# Patient Record
Sex: FEMALE | Race: BLACK OR AFRICAN AMERICAN | Employment: FULL TIME | URBAN - METROPOLITAN AREA
[De-identification: names, ages, dates, MRNs, and addresses within clinical notes are randomized per-mention and may not be internally consistent; named-entity substitution may affect disease eponyms.]

---

## 2019-05-30 ENCOUNTER — APPOINTMENT (OUTPATIENT)
Dept: PHYSICAL THERAPY | Age: 44
End: 2019-05-30

## 2022-02-16 ENCOUNTER — HOSPITAL ENCOUNTER (OUTPATIENT)
Dept: PHYSICAL THERAPY | Age: 47
End: 2022-02-16
Payer: COMMERCIAL

## 2022-02-22 ENCOUNTER — HOSPITAL ENCOUNTER (OUTPATIENT)
Dept: PHYSICAL THERAPY | Age: 47
Discharge: HOME | End: 2022-02-22
Payer: COMMERCIAL

## 2022-02-22 PROCEDURE — 97110 THERAPEUTIC EXERCISES: CPT

## 2022-02-22 PROCEDURE — 97161 PT EVAL LOW COMPLEX 20 MIN: CPT

## 2022-02-22 NOTE — PROGRESS NOTES
[address] [facility] PHYSICAL THERAPY AT [facility] [address] [facility] - Phone: [phone]  Fax: [phone] / [address] [facility]  Patient Name: Bairon Lockett : 1975   Medical   Diagnosis: Left ankle pain [M25.572] Treatment Diagnosis: Left Insertional Achilles Tendinitis. Onset Date: \"over time\"; this episode 6-8 months ago     Referral Source: Rhea Pitts PA-C Start of Care [facility]): 2022   Prior Hospitalization: See medical history Provider #: [id number]   Prior Level of Function: Previous episode of L heel/Achilles pain 3-4 years ago, mostly resolved with use of boot, occasional pain and limping since. Pt works full time from home as an  (sedentary), active with household chores and driving her teenaged children to/from activities; stairs at home and up/down one at a time; occasionally watches small children; was in a walking program.   Comorbidities: DM; HTN   Medications: Verified on Patient Summary List     The Plan of Care and following information is based on the information from the initial evaluation.   ==================================================================================  Assessment / key information:  Bairon Lockett is a 55 y.o. female with Dx of Left ankle pain [M25.572]. She currently rates her pain as 10/10 at worst, 3/10 at best, primarily located at left Achilles tendon insertion area. She complains of difficulty and increased pain with \"sitting too long\"--standing and walking after longer periods of non weightbearing (sit, lie down). Today in PT, patient reports left Achilles/heel area had \"issues\" 3-4 years ago and she saw a doctor and GABRIELLA COLLINS put into a boot; condition improved, but she has been limping since that episode and occasional pain.   Current episode of same pain began 6-8 months ago without known precipitating event or circumstances. Pt has used her boot occasionally with this episode; last was about 2 weeks ago after a trip to [place] and lots of walking after sitting through her kids' basketball games. Pt reports that her pain is the worst with first getting out of bed in the mornings and beginning to walk. Pt works from home and sits most of the day and her L Achilles/heel hurts a lot with first standing and walking after getting up from her chair. Pt's pain is a little better after she has walked a little bit, but it is increased by longer periods/distances of walking. Pt reports having xrays of her L foot/ankle taken recently and showed \"inflammation\" and a \"spur\" per patient. Pt relates being advised to use boot prn and to use OTC Voltaren gel, which she has been doing for about 1-1.5 weeks; she was also given a heel lift and shown stretching. Pt reports that she also frequently does ankle AROM and she elevates her L LE when possible. Pt often wears tennis shoes or slides, no heels, occasionally low boots; she reports type of foot wear does not affect her symptoms and most footwear does not contact the area of her pain, except the boots and no worse. Pt indicates pain location at left distal Achilles tendon near/at insertion onto calcaneus and reports that it is tender to palpation. Pt denies visible or palpable swelling or deformity in the area of her pain and tenderness. Pt reports R Achilles is OK, but \"acts up\" every now and then; she denies any other LE joint or muscle injuries or problems. Pt reports that she is busy all day with work and then on the go driving her 2 children (ages 12 and 16 yrs old) to their activities. Pt does indoor household chores and her children assist; the kids can do the outdoor chores. Pt has stairs in her home and goes up and down them one at a time; she reports R LE lead for both up and down. Pt occasionally watches her 3and 1year old grand nieces, but her own children help out. Pt reports having insomnia but that her L heel pain does not affect her ability to fall asleep or awaken her during the night, but pain with first standing/walking after getting OOB. Pt reports that driving (automatic transmission) is OK. Pt has access to fitness center/Rec Center nearby. Objective Findings:      Observation:  Moderate bilat foot arches in sitting with feet resting on the floor and low bilaterally in standing; toes rectus, maybe slight hallux valgus right. .  Palpation:  Tender at left distal Achilles tendon and insertion onto calcaneous with mildly increased skin warmth and possible mild swelling or thickening in the area. Functional Mobility:  Sit <==> Stand = WFL with some hand use and OK; Standing = OK; SLS = able to perform on L LE with mildly increased discomfort, OK right; Heel Raises (ankle PF in SLS) = WFL and OK right, unable to perform and painful left; Bend/Squat = patient able to retrieve small item from floor with hand by bending over at hips/trunk with R LE forward/L LE back, able to reach both hands to floor for simulated lift by bending over > squatting (some knee flexion) and R foot somewhat forward/left back; Heel Walking = Not Tested. Gait:  Slowed deb, antalgic left, increased L toe out and decreased L stance time; pt reporting L Achilles pain in L LE weightbearing, especially late stance/push-off. Left Ankle AROM (seated, knee flexed):  DF = -3 to -4 deg L and \"tight\" Achilles, 8 deg R and OK; PF = 48 deg L and 60 deg R, OK bilat; INV and EV = Not Tested, but grossly WFL and OK for both bilaterally. LE PROM and Flexibility:  Hallux DF (supine) = limited bilaterally to about 20-30 degrees, L > R, but not painful; Toes #2-5 DF (as a group) = WFL and OK bilat; Gastroc (supine) = 12 deg bilat, OK right and some distal Achilles/heel discomfort L near/at end range.   LE Manual Muscle Testing (isometric hold in mid range, seated):  Hallux DF = 5/5 and OK bilat; Hallux PF = 5/5 and OK bilat; Toes #2-5 DF (as a group) = WFL and OK bilat; Toes #2-5 PF (as a group) = WFL and OK bilat; Ankle DF = 5/5 and OK bilat; Ankle INV = 5/5 and OK bilat; Ankle EV = 5/5 and OK bilat; Ankle PF = 5/5 and OK right, 4-/5 L and distal Achilles/heell pain. Pt instructed in HEP and will f/u in clinic for PT.  ======================================================= ===================================  Eval Complexity:  History:  MEDIUM  Complexity : 1-2 comorbidities / personal factors will impact the outcome/ POC ;  Examination:  MEDIUM Complexity : 3 Standardized tests and measures addressing body structure, function, activity limitation and / or participation in recreation ; Presentation:  LOW Complexity : Stable, uncomplicated ;  Decision Making:  HIGH Complexity : FOTO score of 1- 25 ; Overall Complexity:  LOW . Problem List:  pain affecting function, decrease ROM, decrease strength, impaired gait/ balance, decrease ADL/ functional abilitiies, decrease activity tolerance and decrease flexibility/ joint mobility. Treatment Plan may include any combination of the following:  Therapeutic exercise, Therapeutic activities, Neuromuscular re-education, Physical agent/modality, Gait/balance training, Manual therapy, Patient education, Self Care training, Functional mobility training, Home safety training and Stair training. Patient / Family readiness to learn indicated by:  asking questions, trying to perform skills and interest.  Persons(s) to be included in education:  Patient (P). Barriers to Learning/Limitations:  None. Measures taken, if barriers to learning: NA   Patient Goal (s): \"min pain, no limping\"     Rehabilitation Potential:  Good.  Short Term Goals: To be accomplished in  3-4 eeks:    1. Independent with HEP for strength, ROM and functional mobility to improve independence with LE ADLs.   2. Decrease max pain to </= 6-7/10 to assist with ADLs, household chores, and community mobility. 3. Increase L ankle DF AROM (seated) by >/= 5-8 degrees. 4. Increase L ankle PF MMT (seated) to >/= 4 to 4+/5 with minimal discomfort. 5. Increase PROM for bilat Hallus DF to >/= 40 degrees.  Long Term Goals: To be accomplished in  6-8 weeks:    1. Decrease max pain to </= 2-3/10 to assist with ADLs, household chores, and community mobility, stairs, and recreation/fitness activities; return to PLOF. 2.  Increase FOTO by >/= 35 points to show functional improvement. 3.  Will rate a +5 on Global Rating of Change and be prepared to DC to [facility] to maximize independence. 4.  Minimal/no tenderness to palpation. 5.  Increase ankle DF AROM (seated) to >/= 10-12 degrees and without pain. 6.  Increase bilat gastroc flexibility to >/= 15-18 degrees and without pain. 7.  Minimal gait deviations. Frequency / Duration:  Patient to be seen  1-2  times per week for 6-8 weeks. Patient / Caregiver education and instruction:  self care and exercises. G-Codes (GP):  NA. Therapist Signature: Santo Gonzalez PT Date: 1/95/9115   Certification Period: NA Time: 3:23 PM   ===========================================================================================  I certify that the above Physical Therapy Services are being furnished while the patient is under my care. I agree with the treatment plan and certify that this therapy is necessary. Physician Signature:        Date:       Time:     Juan Wilson PA-C      Please sign and return to [facility] or you may fax the signed copy to [phone]. Thank you.

## 2022-02-22 NOTE — PROGRESS NOTES
PHYSICAL THERAPY - DAILY TREATMENT NOTE     Patient Name: Cherelle Edwards        Date: 2022  : 1975   YES Patient  Verified  Visit #:      16  Insurance: Payor: Tereso Shaikh / Plan: Yoselin Liang [place] HMO / Product Type: HMO /      In time: 3:20 PM Out time: 4:35 PM   Total Treatment Time: 75 min. Medicare/[employer] Time Tracking (below)   Total Timed Codes (min):  NA 1:1 Treatment Time:  NA     TREATMENT AREA =  Left ankle pain [M25.572]    SUBJECTIVE    Pain Level (on 0 to 10 scale):  7 / 10   Medication Changes/New allergies or changes in medical history, any new surgeries or procedures? NO    If yes, update Summary List   Subjective Functional Status/Changes:  []  No changes reported     See POC         OBJECTIVE  Modalities Rationale:     decrease edema, decrease inflammation and decrease pain to improve patient's ability to stand, walk, community mobility, household chores, stairs, and recreation/fitness activities with no/less pain.          min [] Estim, type/location:                                      []  att     []  unatt     []  w/US     []  w/ice    []  w/heat    min []  Mechanical Traction: type/lbs                   []  pro   []  sup   []  int   []  cont    []  before manual    []  after manual    min []  Ultrasound, settings/location:      min []  Iontophoresis w/ dexamethasone, location:                                               []  take home patch       []  in clinic   10 min [x]  Ice     []  Heat    location/position: Left posterior heel/distal Achilles tendon post-Tx while HEP handouts prepared/seated with foot on floor and ankle in DF.    min []  Vasopneumatic Device, press/temp:     min []  Other:    [x] Skin assessment post-treatment (if applicable):    [x]  intact    []  redness- no adverse reaction     []redness - adverse reaction:      15 min Therapeutic Exercise:  [x]  See flow sheet   Rationale:      increase ROM, increase strength and increase proprioception to improve the patients ability to stand, walk, community mobility, household chores, stairs, and recreation/fitness activities with no/less pain. 3 min Self Care: Discussed use of heat and ice/ice massage; use of boot prn. Rationale:    increase ROM, increase strength and increase proprioception to improve the patients ability to stand, walk, community mobility, household chores, stairs, and recreation/fitness activities with no/less pain. Billed With/As:   [x] TE   [x] TA   [x] Neuro   [x] Self Care Patient Education: [x] Review HEP    [x] Progressed/Changed HEP based on:   [x] positioning   [x] body mechanics   [] transfers   [x] heat/ice application    [x] other:  Patient instructed in, PT demonstrated, and patient briefly performed beginning HEP. Patient given handouts with pictures and written directions for HEP; copies of handouts placed in patient's chart. Other Objective/Functional Measures:    See POC     Post Treatment Pain Level (on 0 to 10) scale:   ? / 10--Not Reassessed. ASSESSMENT    Assessment/Changes in Function:     See POC     []  See Progress Note/Recertification   Patient will continue to benefit from skilled PT services to modify and progress therapeutic interventions, address functional mobility deficits, address ROM deficits, address strength deficits, analyze and address soft tissue restrictions, analyze and cue movement patterns, analyze and modify body mechanics/ergonomics and instruct in home and community integration to attain remaining goals.       Progress toward goals / Updated goals:    See POC       PLAN    [x]  Upgrade activities as tolerated YES Continue plan of care   []  Discharge due to :    []  Other:      Therapist: Camilla Fontana PT    Date: 2/22/2022 Time: 3:23 PM     Future Appointments   Date Time Provider Yennifer Booth   2/22/2022  3:30 PM Shantelle Gibbons PT [facility]

## 2022-03-01 ENCOUNTER — HOSPITAL ENCOUNTER (OUTPATIENT)
Dept: PHYSICAL THERAPY | Age: 47
Discharge: HOME | End: 2022-03-01
Payer: COMMERCIAL

## 2022-03-01 PROCEDURE — 97110 THERAPEUTIC EXERCISES: CPT

## 2022-03-01 PROCEDURE — 97140 MANUAL THERAPY 1/> REGIONS: CPT

## 2022-03-01 PROCEDURE — 97035 APP MDLTY 1+ULTRASOUND EA 15: CPT

## 2022-03-01 NOTE — PROGRESS NOTES
PHYSICAL THERAPY - DAILY TREATMENT NOTE     Patient Name: Cash Keith        Date: 3/1/2022  : 1975   YES Patient  Verified  Visit #:   2   of   16  Insurance: Payor: Evangelina Jiménez / Plan: Lakisha Valdes HMO / Product Type: HMO /      In time: 4:35 PM Out time: 5:29 PM   Total Treatment Time: 54 min. Medicare/[employer] Time Tracking (below)   Total Timed Codes (min):  NA 1:1 Treatment Time:  NA     TREATMENT AREA =  Left ankle pain [M25.572]    SUBJECTIVE    Pain Level (on 0 to 10 scale):  5 / 10   Medication Changes/New allergies or changes in medical history, any new surgeries or procedures? NO    If yes, update Summary List   Subjective Functional Status/Changes:  []  No changes reported       Doing AROM and stretching prior to getting OOB and walking is helpful for decreasing pain. Pt reports no problems with/questions about HEP. OBJECTIVE  Modalities Rationale:     decrease edema, decrease inflammation, decrease pain and increase tissue extensibility to improve patient's ability to stand, walk, community mobility, household chores, stairs, and recreation/fitness activities with no/less pain. min [] Estim, type/location:                                      []  att     []  unatt     []  w/US     []  w/ice    []  w/heat    min []  Mechanical Traction: type/lbs                   []  pro   []  sup   []  int   []  cont    []  before manual    []  after manual   8 min [x]  Ultrasound, settings/location:  1.0 W/cm2 @ 3 MHz 100% x 5.5 min and 1.0 W/cm2 @ 1 MHz x 2.5 min to distal L Achilles insertion in prone with concurrent manual gastroc stretching.    min []  Iontophoresis w/ dexamethasone, location:                                               []  take home patch       []  in clinic   10 min []  Ice     [x]  Heat    location/position: Left posterior leg and heel pre-Tx/supine with knee in extension; last 5 minutes with concurrent self-stretching gastroc using strap at forefoot.     min []  Vasopneumatic Device, press/temp:    If using vaso (only need to measure limb vaso being performed on)      pre-treatment girth :       post-treatment girth :       measured at (landmark location) :      min []  Other:    [x] Skin assessment post-treatment (if applicable):    [x]  intact    []  redness- no adverse reaction                  []redness - adverse reaction:      12 min Therapeutic Exercise:  [x]  See flow sheet   Rationale:      increase ROM and increase strength to improve the patients ability to stand, walk, community mobility, household chores, stairs, and recreation/fitness activities with no/less pain. 24 min Manual Therapy: Technique:      [x] S/DTM [x]IASTM []PROM [x] Passive Stretching   []manual TPR    []Jt manipulation:Gr I [] II []  III [] IV[] V[]  Treatment Area: In prone, bouts of manual stretching to left gastroc; continuous light stretch to same x 5 min during US. Cross friction massage to distal L Achilles in prone--about 1-2 minutes to total of about 5 different area of posterior and medial tendon (~7 min total). IASTM with RockBlade to L Achilles in prone--bouts of feathering, deep stroking along tendon distal ==> prox and prox ==> distal, and focused deep friction to areas of distal Achilles. Rationale:      decrease pain, increase ROM, increase tissue extensibility and decrease edema  to improve patient's ability to stand, walk, community mobility, household chores, stairs, and recreation/fitness activities with no/less pain. The manual therapy interventions were performed at a separate and distinct time from the therapeutic activities interventions.     Billed With/As:   [] TE   [] TA   [] Neuro   [] Self Care Patient Education: [x] Review HEP    [] Progressed/Changed HEP based on:   [] positioning   [] body mechanics   [] transfers   [] heat/ice application    [] other:        Other Objective/Functional Measures:    Tender left distal posterior Achilles with small area of focal thickening; also tender medial aspect of left distal Achilles. Post Treatment Pain Level (on 0 to 10) scale:   3 / 10     ASSESSMENT    Assessment/Changes in Function:     Mild discomfort with manual stretching; able to tolerate DTM and IASTM. []  See Progress Note/Recertification   Patient will continue to benefit from skilled PT services to modify and progress therapeutic interventions, address functional mobility deficits, address ROM deficits, address strength deficits, analyze and address soft tissue restrictions, analyze and cue movement patterns, analyze and modify body mechanics/ergonomics and instruct in home and community integration to attain remaining goals. Progress toward goals / Updated goals:    Good Progress to    [x] STG    [] LTG  1 - 3 as shown by patient reporting and observed motion during PT session. PLAN    [x]  Upgrade activities as tolerated YES Continue plan of care   []  Discharge due to :    [x]  Other: Work on PROM for first toe DF and ankle T-band exercises.      Therapist: Amber Plascencia PT    Date: 3/1/2022 Time: 12:34 PM     Future Appointments   Date Time Provider Yennifer Booth   3/1/2022  4:30 PM Leigh Cali, PT [facility]   3/3/2022  4:30 PM Leigh Cali, PT [facility]   3/8/2022  4:30 PM Leigh Cali, PT [facility]   3/10/2022  5:15 PM Leigh Cali PT [facility]   3/15/2022  4:30 PM Leigh Cali, PT [facility]   3/17/2022  4:30 PM Leigh Cali, PT [facility]   3/22/2022  4:30 PM Leigh Cali, PT [facility]   3/24/2022  4:30 PM Leigh Cali, PT [facility]   3/29/2022  4:30 PM Leigh Cali, PT [facility]   3/31/2022  4:30 PM Leigh Cali, PT [facility]   4/5/2022  4:30 PM Leigh Cali, PT [facility]   4/7/2022  4:30 PM Leigh Cali, PT [facility]   4/12/2022  4:30 PM Leigh Cali, PT [facility]   4/14/2022  4:30 PM Leigh Cali, PT [facility]

## 2022-03-03 ENCOUNTER — HOSPITAL ENCOUNTER (OUTPATIENT)
Dept: PHYSICAL THERAPY | Age: 47
End: 2022-03-03
Payer: COMMERCIAL

## 2022-03-08 ENCOUNTER — HOSPITAL ENCOUNTER (OUTPATIENT)
Dept: PHYSICAL THERAPY | Age: 47
Discharge: HOME | End: 2022-03-08
Payer: COMMERCIAL

## 2022-03-08 PROCEDURE — 97140 MANUAL THERAPY 1/> REGIONS: CPT

## 2022-03-08 PROCEDURE — 97110 THERAPEUTIC EXERCISES: CPT

## 2022-03-08 PROCEDURE — 97035 APP MDLTY 1+ULTRASOUND EA 15: CPT

## 2022-03-08 NOTE — PROGRESS NOTES
PHYSICAL THERAPY - DAILY TREATMENT NOTE      Patient Name: Sang Speaker                                   Date: 2022  : 1975                                    YES Patient  Verified  Visit #:   3      16                  Insurance: Payor: Jean Weaver / Plan: [address] [place] [address] [place] [employer] / Product Type: HMO /       In time: 4:37 PM Out time: 5:30 PM   Total Treatment Time: 53 min.          Medicare/[employer] Time Tracking (below)   Total Timed Codes (min):  NA 1:1 Treatment Time:  NA      TREATMENT AREA =  Left ankle pain [M25.572]     SUBJECTIVE     Pain Level (on 0 to 10 scale):  5-6  10   Medication Changes/New allergies or changes in medical history, any new surgeries or procedures? NO    If yes, update Summary List   Subjective Functional Status/Changes:  []? No changes reported         Doing AROM and stretching prior to getting OOB and walking is helpful for decreasing pain. Pt reports no problems with/questions about HEP.        OBJECTIVE  Modalities Rationale:     decrease edema, decrease inflammation, decrease pain and increase tissue extensibility to improve patient's ability to stand, walk, community mobility, household chores, stairs, and recreation/fitness activities with no/less pain.                 min []? Estim, type/location:                                                            []?  att     []?  unatt     []?  w/US     []?  w/ice    []?  w/heat     min []? Mechanical Traction: type/lbs                    []?  pro   []?  sup   []? int   []?  cont    []? before manual    []? after manual   8 min [x]? Ultrasound, settings/location:  1.0 W/cm2 @ 3 MHz 100% x 4 min and 1.0 W/cm2 @ 1.5 MHz x 4 min to distal L Achilles insertion in prone with concurrent manual gastroc stretching.     min []? Iontophoresis w/ dexamethasone, location:                                                []?  take home patch       []? in clinic   10 min []? Ice     [x]?   Heat    location/position: Left posterior leg and heel pre-Tx/supine with knee in extension; last 5 minutes with concurrent self-stretching gastroc using strap at forefoot.     min []? Vasopneumatic Device, press/temp:     If using vaso (only need to measure limb vaso being performed on)      pre-treatment girth :       post-treatment girth :       measured at (landmark location) :       min []? Other:     [x]? Skin assessment post-treatment (if applicable):    [x]? intact    []? redness- no adverse reaction                  []?redness - adverse reaction:        12 min Therapeutic Exercise:  [x]? See flow sheet   Rationale:      increase ROM and increase strength to improve the patients ability to stand, walk, community mobility, household chores, stairs, and recreation/fitness activities with no/less pain.       23 min Manual Therapy: Technique:      [x]? S/DTM [x]? IASTM []? PROM [x]? Passive Stretching   []?manual TPR    []? Jt manipulation:Gr I []? II []? III []? IV[]? V[]? Treatment Area: In prone, bouts of manual stretching to left gastroc; continuous light stretch to same x 5 min during US. Cross friction massage to distal L Achilles in prone--about 1-2 minutes to total of about 5 different area of posterior and medial tendon (~7 min total). IASTM with RockBlade to L Achilles in prone--bouts of feathering, deep stroking along tendon distal ==> prox and prox ==> distal, and focused deep friction to areas of distal Achilles. Rationale:      decrease pain, increase ROM, increase tissue extensibility and decrease edema  to improve patient's ability to stand, walk, community mobility, household chores, stairs, and recreation/fitness activities with no/less pain.   The manual therapy interventions were performed at a separate and distinct time from the therapeutic activities interventions.     Billed With/As:   []? TE   []? TA   []? Neuro   []? Self Care Patient Education: [x]? Review HEP    []?  Progressed/Changed HEP based on:   []? positioning []? body mechanics   []? transfers   []? heat/ice application    []? other:              Other Objective/Functional Measures:     Tender left distal posterior Achilles with small area of focal thickening; also tender medial aspect of left distal Achilles. Pt reports being a little sore post-Tx and mild limping with leaving PT.      Post Treatment Pain Level (on 0 to 10) scale:   ? / 10--Not Reassessed.      ASSESSMENT     Assessment/Changes in Function:     Okay with manual stretching; some discomfort with DTM and IASTM.           []? See Progress Note/Recertification   Patient will continue to benefit from skilled PT services to modify and progress therapeutic interventions, address functional mobility deficits, address ROM deficits, address strength deficits, analyze and address soft tissue restrictions, analyze and cue movement patterns, analyze and modify body mechanics/ergonomics and instruct in home and community integration to attain remaining goals. Progress toward goals / Updated goals:     Good Progress to    [x]? STG    []? LTG  1 - 3 as shown by patient reporting and observed motion during PT session.         PLAN           [x]? Upgrade activities as tolerated YES Continue plan of care   []? Discharge due to :     [x]?   Other: Work on PROM for first toe DF and ankle T-band exercises.      Therapist: JORGE Ramirez     Date: 03/08/2022 Time: 11:45 AM

## 2022-03-10 ENCOUNTER — APPOINTMENT (OUTPATIENT)
Dept: PHYSICAL THERAPY | Age: 47
End: 2022-03-10
Payer: COMMERCIAL

## 2022-03-17 ENCOUNTER — HOSPITAL ENCOUNTER (OUTPATIENT)
Dept: PHYSICAL THERAPY | Age: 47
Discharge: HOME | End: 2022-03-17
Payer: COMMERCIAL

## 2022-03-17 PROCEDURE — 97110 THERAPEUTIC EXERCISES: CPT

## 2022-03-17 PROCEDURE — 97140 MANUAL THERAPY 1/> REGIONS: CPT

## 2022-03-17 PROCEDURE — 97035 APP MDLTY 1+ULTRASOUND EA 15: CPT

## 2022-03-17 NOTE — PROGRESS NOTES
PHYSICAL THERAPY - DAILY TREATMENT NOTE      Patient Name: Virginia Fitzgerald                                   Date: 2022  : 1975                                    YES Patient  Verified  Visit #:                     Insurance: Payor: Makenna Hill / Plan: [address] [place] [address] [place] [employer] / Product Type: HMO /       In time: 4:30 PM Out time: 5:30 PM   Total Treatment Time: 60 min.            Medicare/[employer] Time Tracking (below)   Total Timed Codes (min):  NA 1:1 Treatment Time:  NA      TREATMENT AREA =  Left ankle pain [M25.572]     SUBJECTIVE     Pain Level (on 0 to 10 scale):  5 / 10   Medication Changes/New allergies or changes in medical history, any new surgeries or procedures?    NO    If yes, update Summary List   Subjective Functional Status/Changes:  []??  No changes reported         Pt reports that she is trying to be very consistent with her HEP--no questions or problems. OK later after last PT session.         OBJECTIVE  Modalities Rationale:     decrease edema, decrease inflammation, decrease pain and increase tissue extensibility to improve patient's ability to stand, walk, community mobility, household chores, stairs, and recreation/fitness activities with no/less pain.                           min []? ? Estim, type/location:                                                            []? ?  att     []? ?  unatt     []? ?  w/US     []? ?  w/ice    []? ?  w/heat     min []? ?  Mechanical Traction: type/lbs                    []? ?  pro   []? ?  sup   []? ?  int   []? ?  cont    []? ?  before manual    []? ?  after manual   8 min [x]? ?  Ultrasound, settings/location:  1.2 W/cm2 @ 3 MHz 100% x 4 min and 1.0 W/cm2 @ 1.2 MHz x 4 min to distal L Achilles insertion in prone with concurrent manual gastroc stretching.     min []? ?  Iontophoresis w/ dexamethasone, location:                                                []? ?  take home patch       []? ?[address]  [facility]   10 min []? ?  Ice     [x]? ?  Heat    location/position: Left posterior leg and heel pre-Tx/supine with knee in extension; last 5 minutes with concurrent self-stretching gastroc using strap at forefoot.     min []? ?  Vasopneumatic Device, press/temp:     If using vaso (only need to measure limb vaso being performed on)      pre-treatment girth :       post-treatment girth :       measured at (landmark location) :       min []? ?  Other:     [x]? ? Skin assessment post-treatment (if applicable):    [x]? ?  intact    []? ?  redness- no adverse reaction                  []? ?redness - adverse reaction:         15 min Therapeutic Exercise:  [x]? ?  See flow sheet   Rationale:      increase ROM and increase strength to improve the patients ability to stand, walk, community mobility, household chores, stairs, and recreation/fitness activities with no/less pain.       25 min Manual Therapy: Technique:      [x]? ? S/DTM [x]? ?IASTM []? ?PROM [x]? ? Passive Stretching   []? ?manual TPR    []? ?Jt manipulation:Gr I []? ? II []??  III []?? IV[]? ? V[]?? Treatment Area:  In prone, bouts of manual stretching to left gastroc; continuous light stretch to same x 5 min during US.  Cross friction massage to distal L Achilles in prone--about 1-2 minutes to total of about 5 different area of posterior and medial tendon (~7 min total).  IASTM with RockBlade to L Achilles in prone--bouts of feathering, deep stroking along tendon distal ==> prox and prox ==> distal, and focused deep friction to areas of distal Achilles. Rationale:      decrease pain, increase ROM, increase tissue extensibility and decrease edema  to improve patient's ability to stand, walk, community mobility, household chores, stairs, and recreation/fitness activities with no/less pain.   The manual therapy interventions were performed at a separate and distinct time from the therapeutic activities interventions.     Billed With/As:   []?? TE   []?? TA   []? ? Neuro   []? ? Self Care Patient Education: [x]? ? Review HEP    []?? Progressed/Changed HEP based on:   []?? positioning   []? ? body mechanics   []? ? transfers   []? ? heat/ice application    []? ? other:                Other Objective/Functional Measures:     Tender left distal posteromedial Achilles ; small area of focal thickening left distal Achilles posteriorly/midline.      Post Treatment Pain Level (on 0 to 10) scale:   5 / 10      ASSESSMENT     Assessment/Changes in Function:        Okay with manual stretching; some discomfort with DTM and IASTM. []??  See Progress Note/Recertification   Patient will continue to benefit from skilled PT services to modify and progress therapeutic interventions, address functional mobility deficits, address ROM deficits, address strength deficits, analyze and address soft tissue restrictions, analyze and cue movement patterns, analyze and modify body mechanics/ergonomics and instruct in home and community integration to attain remaining goals.      Progress toward goals / Updated goals:     Good Progress to    [x]? ? STG    []? ? LTG  1 - 3 as shown by patient reporting and observed motion during PT session.         PLAN               [x]? ?  Upgrade activities as tolerated YES Continue plan of care   []? ?  Discharge due to :     [x]? ?  Other: Work on PROM for first toe DF and ankle T-band exercises.            Therapist: JORGE Eaton     Date: 03/17/2022 Time: 1:07 PM

## 2022-03-22 ENCOUNTER — APPOINTMENT (OUTPATIENT)
Dept: PHYSICAL THERAPY | Age: 47
End: 2022-03-22
Payer: COMMERCIAL

## 2022-03-23 ENCOUNTER — HOSPITAL ENCOUNTER (OUTPATIENT)
Dept: PHYSICAL THERAPY | Age: 47
Discharge: HOME | End: 2022-03-23
Payer: COMMERCIAL

## 2022-03-23 PROCEDURE — 97110 THERAPEUTIC EXERCISES: CPT

## 2022-03-23 PROCEDURE — 97035 APP MDLTY 1+ULTRASOUND EA 15: CPT

## 2022-03-23 PROCEDURE — 97140 MANUAL THERAPY 1/> REGIONS: CPT

## 2022-03-23 PROCEDURE — 97530 THERAPEUTIC ACTIVITIES: CPT

## 2022-03-23 NOTE — PROGRESS NOTES
PHYSICAL THERAPY - DAILY TREATMENT NOTE     Patient Name: Daily Cannon        Date: 3/23/2022  : 1975   YES Patient  Verified  Visit #:  of   16  Insurance: Payor: Nitin Romero / Plan: [employer] / Product Type: HMO /      In time: 4:17 pm Out time: 5:25    Total Treatment Time: 68     Medicare/[employer] Time Tracking (below)   Total Timed Codes (min):  - 1:1 Treatment Time:  -     TREATMENT AREA =  Left ankle pain [M25.572]    SUBJECTIVE    Pain Level (on 0 to 10 scale):  5 / 10   Medication Changes/New allergies or changes in medical history, any new surgeries or procedures?     NO    If yes, update Summary List   Subjective Functional Status/Changes:  []  No changes reported       Functional improvements: pt reports she feels better when she leaves therapy  Functional impairments: worse in am still getting up to 10/10  and after sitting during the day work          OBJECTIVE  Modalities Rationale:     decrease edema, decrease inflammation, decrease pain and increase tissue extensibility to improve patient's ability to decrease edema, decrease inflammation, decrease pain and increase tissue extensibility to improve patient's ability to stand, walk, community mobility, household chores, stairs, and recreation/fitness activities with no/less pain.    min [] Estim, type/location:                                      []  att     []  unatt     []  w/US     []  w/ice    []  w/heat    min []  Mechanical Traction: type/lbs                   []  pro   []  sup   []  int   []  cont    []  before manual    []  after manual   8 min []  Ultrasound, settings/location:  1.2 W/cm2 @ 3 MHz 100% x 4 min and 1.0 W/cm2 @ 1.2 MHz x 4 min to distal L Achilles insertion in prone with concurrent manual gastroc stretching.    min []  Iontophoresis w/ dexamethasone, location:                                               []  take home patch       []  in clinic   20 min []  Ice     []  Heat    location/position: Left posterior leg and heel pre-Tx/supine with knee elevated ; last 5 minutes with concurrent self-stretching gastroc using strap at forefoot. min []  Vasopneumatic Device, press/temp:    If using vaso (only need to measure limb vaso being performed on)      pre-treatment girth :       post-treatment girth :       measured at (landmark location) :      min []  Other:    [x] Skin assessment post-treatment (if applicable):    [x]  intact    []  redness- no adverse reaction                  []redness - adverse reaction:      16 min Therapeutic Exercise:  [x]  See flow sheet   Rationale:      increase ROM and increase strength to improve the patients ability to decrease edema, decrease inflammation, decrease pain and increase tissue extensibility to improve patient's ability to stand, walk, community mobility, household chores, stairs, and recreation/fitness activities with no/less pain.     10 min Therapeutic Activity: [x]  Pt education in proper supportive footwear, activity modification, use of HEP , ice , slow progression of home walking program ~ 5 min as tolerated   Rationale:      increase ROM and increase strength to improve the patients ability to to improve the patients ability to decrease edema, decrease inflammation, decrease pain and increase tissue extensibility to improve patient's ability to stand, walk, community mobility, household chores, stairs, and recreation/fitness activities with no/less pain    15 min Manual Therapy: Technique:        In prone DTM/STM to left gastroc, PF, Achilles lifting, gentle PROM into DF; review self ms techniques   Rationale:      decrease pain, increase ROM, increase tissue extensibility and decrease trigger points to improve patient's ability to to improve the patients ability to to improve the patients ability to decrease edema, decrease inflammation, decrease pain and increase tissue extensibility to improve patient's ability to stand, walk, community mobility, household chores, stairs, and recreation/fitness activities with no/less juan  The manual therapy interventions were performed at a separate and distinct time from the therapeutic activities interventions. Billed With/As:   [] TE   [] TA   [] Neuro   [] Self Care Patient Education: [x] Review HEP    [] Progressed/Changed HEP based on:   [] positioning   [] body mechanics   [] transfers   [] heat/ice application    [] other:        Other Objective/Functional Measures:    Pt education in supportive footwear, HEP, activity modification for pain management     Post Treatment Pain Level (on 0 to 10) scale:   4 / 10     ASSESSMENT    Assessment/Changes in Function:     TrPt tenderness persists distal Achilles; pt education in self ms techniques     []  See Progress Note/Recertification   Patient will continue to benefit from skilled PT services to modify and progress therapeutic interventions, address functional mobility deficits, address ROM deficits, address strength deficits, analyze and address soft tissue restrictions, analyze and cue movement patterns, analyze and modify body mechanics/ergonomics and instruct in home and community integration to attain remaining goals. Progress toward goals / Updated goals:  1. Independent with HEP for strength, ROM and functional mobility to improve independence with LE ADLs. met pt performing HEP daily  2. Decrease max pain to </= 6-7/10 to assist with ADLs, household chores, and community mobility. Partially met- max pain this week 8/10   3. Increase L ankle DF AROM (seated) by >/= 5-8 degrees. 4. Increase L ankle PF MMT (seated) to >/= 4 to 4+/5 with minimal discomfort. 5. Increase PROM for bilat Hallus DF to >/= 40 degrees.      PLAN    [x]  Upgrade activities as tolerated YES Continue plan of care   []  Discharge due to :    []  Other:      Therapist: Barbara Prieto PTA    Date: 3/23/2022 Time: 5:25  PM     Future Appointments   Date Time Provider Yennifer Booth   3/23/2022  4:15 PM Kimberly Harper, PTA [facility]   3/24/2022  4:30 PM Brayden Furry, PT [facility]   3/29/2022  4:30 PM Brayden Furry, PT [facility]   3/31/2022  4:30 PM Brayden Furry, PT [facility]   4/5/2022  4:30 PM Brayden Furry, PT [facility]   4/7/2022  4:30 PM Brayden Furry, PT [facility]   4/12/2022  4:30 PM Brayden Furry, PT [facility]   4/14/2022  4:30 PM [facility]

## 2022-03-29 ENCOUNTER — APPOINTMENT (OUTPATIENT)
Dept: PHYSICAL THERAPY | Age: 47
End: 2022-03-29
Payer: COMMERCIAL

## 2022-03-31 ENCOUNTER — HOSPITAL ENCOUNTER (OUTPATIENT)
Dept: PHYSICAL THERAPY | Age: 47
Discharge: HOME | End: 2022-03-31
Payer: COMMERCIAL

## 2022-03-31 PROCEDURE — 97140 MANUAL THERAPY 1/> REGIONS: CPT

## 2022-03-31 PROCEDURE — 97035 APP MDLTY 1+ULTRASOUND EA 15: CPT

## 2022-03-31 PROCEDURE — 97110 THERAPEUTIC EXERCISES: CPT

## 2022-03-31 NOTE — PROGRESS NOTES
[address] [place] [address] PHYSICAL THERAPY AT [facility] [place], [address]  Phone: [phone]  Fax: [phone]  PROGRESS NOTE  Patient Name: Kayden Cabrera : 1975   Treatment/Medical Diagnosis: Left ankle pain [M25.572]   Referral Source: Ami Ventura PA-C     Date of Initial Visit: 2022 Attended Visits: 6 Missed Visits: 4     SUMMARY OF TREATMENT:    Patient has been seen for a total of 6 visits in PT, including Initial Eval with instruction in beginning HEP/self-care routine and 5 additional treatment session. PT treatment sessions have included MHP, US, Manual Therapy (stretching, DTM, and IASTM), Therapeutic Exercise performance (stretching and strengthening), and HEP progression/patient education. CURRENT STATUS:    Patient has progressed very well in PT, despite inconsistent attendance. Today in PT, patient related that she has been doing lots of stretching and that she can feel a difference in the mornings; when she gets up out of bed, her left Achilles does not hurt as much. Left Ankle DF AROM (knee flexed) and Gastroc flexibility (DF PROM with knee in ext) have both increased a great deal.  Left hallux MTP joint DF PROM has increased to 40 degrees. Tenderness to palpation is still present, but decreased, as well as diminished thickening in the tendon at site of pain/tenderness. Reported pain levels are somewhat decreased, but mild gait deviations persist.  Today's FOTO score was 39/100; her score at intake was 15/100, with a predicted discharge value of 35/100. Goal/Measure of Progress:   Goal Met?:     1.  1. Independent with HEP for strength, ROM and functional mobility to improve independence with LE ADLs. Status at last Eval: No HEP. Current Status: Pt very compliant with home stretching program. Yes/Progressing   2.  2. Decrease max pain to </= 6-7/10 to assist with ADLs, household chores, and community mobility. Status at last Eval: Was 3-10/10. Current Status: Patient has typically been reporting pain levels of ~5/10 when arriving at PT. Yes/Progressing   3.  3. Increase L ankle DF AROM (seated) by >/= 5-8 degrees. Status at last Eval: Was -3 to -4 deg. Current Status: +20 deg. Yes   4.  4. Increase L ankle PF MMT (seated) to >/= 4 to 4+/5 with minimal discomfort. 5. Increase PROM for bilat Hallus DF to >/= 40 degrees. Status at last Eval: 4. Was 4 -/5 with discomfort. 5. Was 20-30 deg. Current Status: 4. WFL with mild discomfort. 5. Now L = 40 deg. 4. Yes/Progressing  5. Yes/Progressing     New Goals to be achieved in __4__  weeks:    1.   1.  Decrease max pain to </= 2-3/10 to assist with ADLs, household chores, and community mobility, stairs, and recreation/fitness activities; return to PLOF--Not Met/Progressing. 2.  2.  Increase FOTO by >/= 35 points to show functional improvement--Progressing. 3.  3.  Will rate a +5 on Global Rating of Change and be prepared to DC to HEP to maximize independence--Not Tested/To Be Assessed. 4.  4.  Minimal/no tenderness to palpation--Progressing. 5.  Increase ankle DF AROM (seated) to >/= 10-12 degrees and without pain--MET. 6.  Increase bilat gastroc flexibility to >/= 15-18 degrees and without pain--MET. 7.  Minimal gait deviations--Progressing. RECOMMENDATIONS:    Continue with PT POC and progress TE/TA and HEP as tolerated to reach all goals, then DC to HEP/Self-Care Routine. If you have any questions/comments please contact us directly at [phone]. Thank you for allowing us to assist in the care of your patient. Therapist Signature: Ganga Morris, PT Date: 3/31/2022     Time: 10:32 AM   NOTE TO PHYSICIAN:  PLEASE COMPLETE THE ORDERS BELOW AND FAX TO   [facility] at [address]: [phone].   If you are unable to process this request in 24 hours please contact our office: [phone].    ___ I have read the above report and request that my patient continue as recommended.   ___ I have read the above report and request that my patient continue therapy with the following changes/special instructions:_________________________________________________   ___ I have read the above report and request that my patient be discharged from therapy.      Physician Signature:                   Date:       Time:

## 2022-03-31 NOTE — PROGRESS NOTES
PHYSICAL THERAPY - DAILY TREATMENT NOTE      Patient Name: Virginia Fitzgerald                                   Date: 2022  : 1975                                    YES Patient  Verified  Visit #:      [id number]                  Insurance: Payor: Madhu Godwin / Plan: [place] [employer] / Product Type: HMO /       In time: 4:30 PM Out time: 5:30 PM   Total Treatment Time: 60 min.            Medicare/[employer] Time Tracking (below)   Total Timed Codes (min):  NA 1:1 Treatment Time:  NA      TREATMENT AREA =  Left ankle pain [M25.572]     SUBJECTIVE     Pain Level (on 0 to 10 scale):  5 / 10   Medication Changes/New allergies or changes in medical history, any new surgeries or procedures?    NO    If yes, update Summary List   Subjective Functional Status/Changes:  []???  No changes reported         Pt reports that she is currently feeling OK and has been doing lots of stretching. Pt relates that she can see and feel a difference in the mornings when she gets up and it does not suzette as much.         OBJECTIVE  Modalities Rationale:     decrease edema, decrease inflammation, decrease pain and increase tissue extensibility to improve patient's ability to stand, walk, community mobility, household chores, stairs, and recreation/fitness activities with no/less pain.                           min []? ?? Estim, type/location:                                                            []? ??  att     []? ??  unatt     []? ??  w/US     []? ??  w/ice    []? ??  w/heat     min []? ??  Mechanical Traction: type/lbs                    []? ??  pro   []? ??  sup   []? ??  int   []? ??  cont    []? ??  before manual    []? ??  after manual   8 min [x]? ??  Ultrasound, settings/location:  0.6 - 0.8 W/cm2 @ 3 MHz 100% x 4 min and 1.0 W/cm2 @ 0.8 - 1.0 MHz x 4 min to distal L Achilles insertion in prone with concurrent manual gastroc stretching.     min []? ??  Iontophoresis w/ dexamethasone, location:                                              []? ??  take home patch       []? ??  in clinic   10 min []? ??  Ice     [x]? ??  Heat    location/position: Left posterior leg and heel pre-Tx/supine with knee in extension; last 5 minutes with concurrent self-stretching gastroc using strap at forefoot.     min []? ??  Vasopneumatic Device, press/temp:     If using vaso (only need to measure limb vaso being performed on)      pre-treatment girth :       post-treatment girth :       measured at (landmark location) :       min []? ??  Other:     [x]? ?? Skin assessment post-treatment (if applicable):    [x]? ??  intact    []? ??  redness- no adverse reaction                  []? ??redness - adverse reaction:         27 min Therapeutic Exercise:  [x]? ??  See flow sheet--Included FOTO and Reassessment. Rationale:      increase ROM and increase strength to improve the patients ability to stand, walk, community mobility, household chores, stairs, and recreation/fitness activities with no/less pain.       15 min Manual Therapy: Technique:      [x]? ?? S/DTM [x]? ??IASTM []???PROM [x]? ?? Passive Stretching   []? ??manual TPR    []? ??Jt manipulation:Gr I []? ?? II []???  III []??? IV[]? ?? V[]???  Treatment Area:  In prone, bouts of manual stretching to left gastroc; continuous light stretch to same x 8 min during US.  Cross friction massage to distal L Achilles in prone--about 1-2 minutes to total of about 5 different area of posterior and medial tendon (~7 min total).  IASTM with RockBlade to L Achilles in prone--bouts of feathering, deep stroking along tendon distal ==> prox and prox ==> distal, and focused deep friction to areas of distal Achilles.    Rationale:      decrease pain, increase ROM, increase tissue extensibility and decrease edema  to improve patient's ability to stand, walk, community mobility, household chores, stairs, and recreation/fitness activities with no/less pain.   The manual therapy interventions were performed at a separate and distinct time from the therapeutic activities interventions.     Billed With/As:   []??? TE   []??? TA   []??? Neuro   []??? Self Care Patient Education: [x]??? Review HEP    []? ?? Progressed/Changed HEP based on:   []??? positioning   []? ?? body mechanics   []? ?? transfers   []? ?? heat/ice application    []? ?? other:                Other Objective/Functional Measures:     Mildly tender left distal posteromedial Achilles; less focal thickening left distal Achilles posteriorly/midline.      Post Treatment Pain Level (on 0 to 10) scale:   ? / 10--Not Reassessed      ASSESSMENT     Assessment/Changes in Function:         Okay with manual stretching; some discomfort with DTM and IASTM. See Reassessment.      [x]? ??  See Progress Note/Recertification   Patient will continue to benefit from skilled PT services to modify and progress therapeutic interventions, address functional mobility deficits, address ROM deficits, address strength deficits, analyze and address soft tissue restrictions, analyze and cue movement patterns, analyze and modify body mechanics/ergonomics and instruct in home and community integration to attain remaining goals.      Progress toward goals / Updated goals:     Good Progress to    [x]? ?? STG    []? ?? LTG  1 - 5 as shown by patient reporting and observed motion during PT session/Reassessment.         PLAN               [x]? ??  Upgrade activities as tolerated YES Continue plan of care   []? ??  Discharge due to :     [x]? ??  Other: Work on PROM for first toe DF and ankle T-band exercises.                Therapist: Patti Dong     Date: 03/31/2022 Time: 10:31 AM

## 2022-04-07 ENCOUNTER — HOSPITAL ENCOUNTER (OUTPATIENT)
Dept: PHYSICAL THERAPY | Age: 47
Discharge: HOME | End: 2022-04-07
Payer: COMMERCIAL

## 2022-04-07 PROCEDURE — 97140 MANUAL THERAPY 1/> REGIONS: CPT

## 2022-04-07 PROCEDURE — 97110 THERAPEUTIC EXERCISES: CPT

## 2022-04-07 PROCEDURE — 97035 APP MDLTY 1+ULTRASOUND EA 15: CPT

## 2022-04-07 NOTE — PROGRESS NOTES
PHYSICAL THERAPY - DAILY TREATMENT NOTE      Patient Name: Virginia Fitzgerald                                   Date: 2022  : 1975                                    YES Patient  Verified  Visit #:                     Insurance: Payor: Raisa Block / Plan: [employer] HMO / Product Type: HMO /       In time: 4:39 PM Out time: 5:23 PM   Total Treatment Time: 44 min.            Medicare/[facility] Time Tracking (below)   Total Timed Codes (min):  NA 1:1 Treatment Time:  NA      TREATMENT AREA =  Left ankle pain [M25.572]     SUBJECTIVE     Pain Level (on 0 to 10 scale):  5 / 10   Medication Changes/New allergies or changes in medical history, any new surgeries or procedures?    NO    If yes, update Summary List   Subjective Functional Status/Changes:  []????  No changes reported         Pt reports that she is currently feeling OK and has been doing lots of stretching. Pt relates that she can see and feel a difference in the mornings when she gets up and it does not suzette as much.         OBJECTIVE  Modalities Rationale:     decrease edema, decrease inflammation, decrease pain and increase tissue extensibility to improve patient's ability to stand, walk, community mobility, household chores, stairs, and recreation/fitness activities with no/less pain.                           min []???? Estim, type/location:                                                            []????  att     []????  unatt     []????  w/US     []????  w/ice    []????  w/heat     min []????  Mechanical Traction: type/lbs                    []????  pro   []? ???  sup   []? ???  int   []? ???  cont    []????  before manual    []????  after manual   8 min [x]????  Ultrasound, settings/location:  0.6 - 0.8 W/cm2 @ 3 MHz 100% x 4 min and 1.0 W/cm2 @ 0.8 - 1.0 MHz x 4 min to distal L Achilles insertion in prone with concurrent manual gastroc stretching.     min []????  Iontophoresis w/ dexamethasone, location:                                              []????  take home patch       []????  in clinic   10 min []????  Ice     [x]? ???  Heat    location/position: Left posterior leg and heel pre-Tx/supine with knee in extension; last 5 minutes with concurrent self-stretching gastroc using strap at forefoot.     min []????  Vasopneumatic Device, press/temp:     If using vaso (only need to measure limb vaso being performed on)      pre-treatment girth :       post-treatment girth :       measured at (landmark location) :       min []????  Other:     [x]? ??? Skin assessment post-treatment (if applicable):    [x]? ???  intact    []????  redness- no adverse reaction                  []? ???redness - adverse reaction:         14 min Therapeutic Exercise:  [x]? ???  See flow sheet--Included FOTO and Reassessment. Rationale:      increase ROM and increase strength to improve the patients ability to stand, walk, community mobility, household chores, stairs, and recreation/fitness activities with no/less pain.       12 min Manual Therapy: Technique:      [x]? ??? S/DTM [x]????IASTM []????PROM [x]???? Passive Stretching   []? ???manual TPR    []????Jt manipulation:Gr I []???? II []????  III []???? IV[]? ??? V[]????  Treatment Area:  In prone, bouts of manual stretching to left gastroc; continuous light stretch to same x 8 min during US.  IASTM with RockBlade to L Achilles in prone--bouts of feathering, deep stroking along tendon distal ==> prox and prox ==> distal, and focused deep friction to areas of distal Achilles.    Rationale:      decrease pain, increase ROM, increase tissue extensibility and decrease edema  to improve patient's ability to stand, walk, community mobility, household chores, stairs, and recreation/fitness activities with no/less pain.   The manual therapy interventions were performed at a separate and distinct time from the therapeutic activities interventions.     Billed With/As:   []???? TE   []???? TA   []???? Neuro   []???? Self Care Patient Education: [x]???? Review HEP    []???? Progressed/Changed HEP based on:   []???? positioning   []???? body mechanics   []???? transfers   []???? heat/ice application    []???? other:                Other Objective/Functional Measures:     Mildly tender left distal posteromedial Achilles; less focal thickening left distal Achilles posteriorly/midline.      Post Treatment Pain Level (on 0 to 10) scale:   4 / 10      ASSESSMENT     Assessment/Changes in Function:         Okay with manual stretching; some discomfort with DTM and IASTM but less soft tissue crepitus.      []????  See Progress Note/Recertification   Patient will continue to benefit from skilled PT services to modify and progress therapeutic interventions, address functional mobility deficits, address ROM deficits, address strength deficits, analyze and address soft tissue restrictions, analyze and cue movement patterns, analyze and modify body mechanics/ergonomics and instruct in home and community integration to attain remaining goals.      Progress toward goals / Updated goals:     Good Progress to    [x]? ??? STG    []???? LTG  1 - 5 as shown by patient reporting and observed motion during PT session/Reassessment.         PLAN               [x]? ???  Upgrade activities as tolerated YES Continue plan of care   []????  Discharge due to :     [x]? ???  Other: Work on PROM for first toe DF and ankle T-band exercises.                Therapist: Georeg Kearney     Date: 04/07/2022 Time: 10:40 AM

## 2022-04-14 ENCOUNTER — HOSPITAL ENCOUNTER (OUTPATIENT)
Dept: PHYSICAL THERAPY | Age: 47
Discharge: HOME | End: 2022-04-14
Payer: COMMERCIAL

## 2022-04-14 PROCEDURE — 97110 THERAPEUTIC EXERCISES: CPT

## 2022-04-14 PROCEDURE — 97035 APP MDLTY 1+ULTRASOUND EA 15: CPT

## 2022-04-14 PROCEDURE — 97140 MANUAL THERAPY 1/> REGIONS: CPT

## 2022-04-14 NOTE — PROGRESS NOTES
PHYSICAL THERAPY - DAILY TREATMENT NOTE      Patient Name: Virginia Fitzgerald                                   Date: 2022  : 1975                                    YES Patient  Verified  Visit #:                     Insurance: Payor: Chyna Ayala / Plan: [address] [place] [address] Karlie West HMO / Product Type: HMO /       In time: 4:25 PM Out time: 5:20 PM   Total Treatment Time: 55 min.            Medicare/[facility] Time Tracking (below)   Total Timed Codes (min):  NA 1:1 Treatment Time:  NA      TREATMENT AREA =  Left ankle pain [M25.572]     SUBJECTIVE     Pain Level (on 0 to 10 scale):  7 / 10   Medication Changes/New allergies or changes in medical history, any new surgeries or procedures?    NO    If yes, update Summary List   Subjective Functional Status/Changes:  []?????  No changes reported         Pt reports that her L Achilles pain is \"activiting up some\" today, likely due to being on it more/increased activity, but no specific incident or activity cited.         OBJECTIVE  Modalities Rationale:     decrease edema, decrease inflammation, decrease pain and increase tissue extensibility to improve patient's ability to stand, walk, community mobility, household chores, stairs, and recreation/fitness activities with no/less pain.                           min []????? Estim, type/location:                                                            []?????  att     []?????  unatt     []?????  w/US     []?????  w/ice    []?????  w/heat     min []?????  Mechanical Traction: type/lbs                    []?????  pro   []?????  sup   []?????  int   []?????  cont    []?????  before manual    []?????  after manual   8 min [x]?????  Ultrasound, settings/location:  1.2 W/cm2 @ 1 MHz 100% x 4 min and 1.0 W/cm2 @ 3.3 MHz x 4 min to distal L Achilles insertion in prone with concurrent manual gastroc stretching.     min []?????  Iontophoresis w/ dexamethasone, location:                                                []?????  take home patch       []?????  in clinic   10 min []?????  Ice     [x]?????  Heat    location/position: Left posterior leg and heel pre-Tx/supine with knee in extension; last 5 minutes with concurrent self-stretching gastroc using strap at forefoot.     min []?????  Vasopneumatic Device, press/temp:     If using vaso (only need to measure limb vaso being performed on)      pre-treatment girth :       post-treatment girth :       measured at (landmark location) :       min []?????  Other:     [x]????? Skin assessment post-treatment (if applicable):    [x]?????  intact    []?????  redness- no adverse reaction                  []?????redness - adverse reaction:         22 min Therapeutic Exercise:  [x]? ????  See flow sheet--Included FOTO and Reassessment. Rationale:      increase ROM and increase strength to improve the patients ability to stand, walk, community mobility, household chores, stairs, and recreation/fitness activities with no/less pain.       15 min Manual Therapy: Technique:      [x]????? S/DTM [x]??? ??IASTM []?????PROM [x]????? Passive Stretching   []?????manual TPR    []?????Jt manipulation:Gr I []????? II []?????  III []????? IV[]????? V[]????? Treatment Area:  In prone, bouts of manual stretching to left gastroc; continuous light stretch to same x 8 min during US.  IASTM with RockBlade to L Achilles in prone--bouts of feathering, deep stroking along tendon distal ==> prox and prox ==> distal, and focused deep friction to areas of distal Achilles. Cross friction massage x 30-60 seconds to each of 2 areas posterior and medial distal L Achilles tendon.    Rationale:      decrease pain, increase ROM, increase tissue extensibility and decrease edema  to improve patient's ability to stand, walk, community mobility, household chores, stairs, and recreation/fitness activities with no/less pain.   The manual therapy interventions were performed at a separate and distinct time from the therapeutic activities interventions.     Billed With/As:   []????? TE   []????? TA   []????? Neuro   []????? Self Care Patient Education: [x]????? Review HEP    []????? Progressed/Changed HEP based on:   []????? positioning   []????? body mechanics   []????? transfers   []????? heat/ice application    []????? other:                Other Objective/Functional Measures:     Minimally tender left distal posteromedial Achilles; less focal thickening left distal Achilles posteriorly/midline.      Post Treatment Pain Level (on 0 to 10) scale:   6 / 10      ASSESSMENT     Assessment/Changes in Function:         Okay with manual stretching; some discomfort with DTM and IASTM but less soft tissue crepitus.      []?????  See Progress Note/Recertification   Patient will continue to benefit from skilled PT services to modify and progress therapeutic interventions, address functional mobility deficits, address ROM deficits, address strength deficits, analyze and address soft tissue restrictions, analyze and cue movement patterns, analyze and modify body mechanics/ergonomics and instruct in home and community integration to attain remaining goals.      Progress toward goals / Updated goals:     Good Progress to    [x]????? STG    []????? LTG  1 - 5 as shown by patient reporting and observed motion during PT session/Reassessment.         PLAN               [x]?????  Upgrade activities as tolerated YES Continue plan of care   []?????  Discharge due to :     [x]?????  Other: Work on PROM for first toe DF and ankle T-band exercises.                Therapist: George Kearney     Date: 04/14/2022 Time: 11:51 AM

## 2022-05-02 NOTE — PROGRESS NOTES
[address] [facility] PHYSICAL THERAPY AT [facility] [address] [place], [place] [facility] [address]  Phone: [phone]  Fax: [phone] SUMMARY            Patient Name: Kelly Guerra : 1975   Treatment/Medical Diagnosis: Left ankle pain [M25.572]   Referral Source: Dora Hunter PA-C       Date of Initial Visit: 2022 Attended Visits: 8 Missed Visits: 6      SUMMARY OF TREATMENT:    Patient has been seen for a total of 8 visits in PT, including Initial Eval with instruction in beginning HEP/self-care routine and 7 additional treatment session. Patient was Re-Evaluated on 2022 (visit #6) and Progress Note sent. Since then, patient attended 2 additonal PT sessions (22 and 22) but was a \"No Show\" for 2 PT sessions (22 and 22). PT treatment sessions have included MHP, US, Manual Therapy (stretching, DTM, and IASTM), Therapeutic Exercise performance (stretching and strengthening), and HEP progression/patient education. CURRENT STATUS:    On 22, patient reported \"that her L Achilles pain is \"activiting up some\" today, likely due to being on it more/increased activity, but no specific incident or activity cited\". Therapist noted \"Minimally tender left distal posteromedial Achilles; less focal thickening left distal Achilles posteriorly/midline\" and \" Okay with manual stretching; some discomfort with DTM and IASTM but less soft tissue crepitus\". On 22, patient reported \"that she is currently feeling OK and has been doing lots of stretching\" and \"that she can see and feel a difference in the mornings when she gets up and it does not suzette as much\". Pt did not continue to attend/schedule PT visits after 2022 and has had no further contact with out office--status unknown. From Previous Reassessment on 2022:  \"Patient has progressed very well in PT, despite inconsistent attendance.   Today in PT, patient related that she has been doing lots of stretching and that she can feel a difference in the mornings; when she gets up out of bed, her left Achilles does not hurt as much. Left Ankle DF AROM (knee flexed) and Gastroc flexibility (DF PROM with knee in ext) have both increased a great deal.  Left hallux MTP joint DF PROM has increased to 40 degrees. Tenderness to palpation is still present, but decreased, as well as diminished thickening in the tendon at site of pain/tenderness. Reported pain levels are somewhat decreased, but mild gait deviations persist.  Today's FOTO score was 39/100; her score at intake was 15/100, with a predicted discharge value of 35/100. \"             Goal/Measure of Progress (From Reassessment 03/31/2022):   Goal Met? (From Reassessment 03/31/2022):     1.  1. Independent with HEP for strength, ROM and functional mobility to improve independence with LE ADLs. Status at last Eval: No HEP. Current Status: Pt very compliant with home stretching program. Yes/Progressing   2.  2. Decrease max pain to </= 6-7/10 to assist with ADLs, household chores, and community mobility. Status at last Eval: Was 3-10/10. Current Status: Patient has typically been reporting pain levels of ~5/10 when arriving at PT. Yes/Progressing   3.  3. Increase L ankle DF AROM (seated) by >/= 5-8 degrees. Status at last Eval: Was -3 to -4 deg. Current Status: +20 deg. Yes   4.  4. Increase L ankle PF MMT (seated) to >/= 4 to 4+/5 with minimal discomfort. 5. Increase PROM for bilat Hallus DF to >/= 40 degrees. Status at last Eval: 4. Was 4 -/5 with discomfort. 5. Was 20-30 deg. Current Status: 4. WFL with mild discomfort. 5. Now L = 40 deg. 4. Yes/Progressing  5.  Yes/Progressing      Long Term Goals to be achieved in __4__  weeks:    1.   1.  Decrease max pain to </= 2-3/10 to assist with ADLs, household chores, and community mobility, stairs, and recreation/fitness activities; return to [employer]/Progressing. 2.  2.  Increase FOTO by >/= 35 points to show functional improvement--Progressing. 3.  3.  Will rate a +5 on Global Rating of Change and be prepared to DC to HEP to maximize independence--Not Tested/To Be Assessed. 4.  4.  Minimal/no tenderness to palpation--Progressing. 5.  Increase ankle DF AROM (seated) to >/= 10-12 degrees and without pain--MET. 6.  Increase bilat gastroc flexibility to >/= 15-18 degrees and without pain--MET. 7.  Minimal gait deviations--Progressing. RECOMMENDATIONS:    Discontinue therapy due to lack of attendance or compliance. Plan after Reassessment on 03/31/2022 (visit #6) had been \"continue with PT POC and progress TE/TA and HEP as tolerated to reach all goals, then DC to HEP/Self-Care Routine\". If you have any questions/comments please contact us directly at [phone]. Thank you for allowing us to assist in the care of your patient.     Therapist Signature: Ganga Morris PT Date: 05/02/2022   Reporting Period:   03/31/2022 to 05/02/2022 Time: 10:58 AM